# Patient Record
(demographics unavailable — no encounter records)

---

## 2024-11-26 NOTE — PROCEDURE
[Endometrial Biopsy] : an endometrial biopsy [Thickened Endometrium] : thickened endometrium [Patient] : the patient [Verbal Consent] : verbal consent was obtained prior to the procedure and is detailed in the patient's record [Yes] : the specimen was sent to pathology [No Complications] : none [Tolerated Well] : the patient tolerated the procedure well [FreeTextEntry1] : Pipelle introduced to 7cm. Three passes, scant tissue.

## 2024-11-26 NOTE — ASSESSMENT
[FreeTextEntry1] : I discussed with the patient with the aid of diagrams, reviewed the findings on history and physical examination, and reviewed the imaging studies in detail. We discussed her history of recurrent endometrial polyps and thickened endometrium lining. Patient is interested in definitive management.  	 Surgical approach of hysterectomy also discussed- including minimally invasive and open approaches. Minimally invasive approach will be attempted if appropriate, however if the size of the uterus/fibroids exceed that which is appropriate laparoscopically, an open abdominal approach will be selected. TLH/BSO recommended.  Complications that include, but are not limited to: bleeding, infection, injury to other organs including bowel, bladder, ureters, blood vessels, nerves; infections, blood clots, lymphedema, pneumonia, wound complications and prolonged hospital stay have all been discussed with the patient. Whenever minimally invasive surgery is attempted, there is a chance of needing to convert to laparotomy. The risk of occult injury requiring additional surgery also discussed. I have also provided her with the diagrams.    Surgical scheduling was discussed and instructions for optimization prior to surgery were given. will follow the Enhanced Recovery After Surgery (ERAS) protocol.   She will choose a surgical date. No aspirin or NSAID products for 1 week prior.   [] Embx [] Medical clearance [] PRe-op labs [] TLH/BSO (patient to choose a date, maybe January)

## 2024-11-26 NOTE — PHYSICAL EXAM
[Chaperone Present] : A chaperone was present in the examining room during all aspects of the physical examination [72212] : A chaperone was present during the pelvic exam. [Normal] : Parametria: Normal

## 2024-11-26 NOTE — REASON FOR VISIT
[FreeTextEntry1] : 72yo here for embx 2' thickened Endometrial stripe 0.9cm. Patient presents today requesting a hysterectomy. She is tired of recurrent endometrial thickening and polyps and would like definitive treatment. Importance of biopsy today discussed.   Lao translation services offered, patient declined.   GynHx: As above  ObHx:  x 3  PmHx: None  SurgHx: None  Meds: Sertraline, simvastatin  All: NKDA  SocHx: No toxic habits  FamHx: No cancers    Mammogram/US: 2024- neg Colonoscopy: 2023 negative PAP/HPV: 2023 neg

## 2024-11-26 NOTE — END OF VISIT
Wicho Peters, Ms Jaimie say her current meter supplies are not being covered by Medicare. She says they informed her that she wouldn't be approved for both Dexcom G7 supplies and meter supplies. Does this sound right? Thank you, Kelin [Time Spent: ___ minutes] : I have spent [unfilled] minutes of time on the encounter which excludes teaching and separately reported services.

## 2024-11-26 NOTE — HISTORY OF PRESENT ILLNESS
[FreeTextEntry1] : Problem 1) no polyp- benign endometrium 2) Lichen Sclerosis  Previous Therapy 1) Pap 4/23/18    a)NILM 2)TVUS 5/14/18    a)Right ovarian complex cyst measuring 2.0cm with no internal vascularity.     b)Normal left ovarian cyst  3)US 8/27/18    a)Uterus of 7.2cm   b )Right ovary measures 2.7cm containing a stable but mildly complex cyst with low-level echoes measuring 1.9cm with no internal vascularity.     c)Left ovary is 1.9cm with a new simple cyst of 1.3cm. 4. Pelvis US 11/23/21    a) uterus 7.5cm. Simple fluid distends the endometrium.     b) endometrium- 1cm soft tissue nodule c/w polyp    c) 17 mm simple cyst in the right ovary  5) D&C, Hysteroscopy, polypectomy 3/21/22     a) atrophic endometrium     b) Unremarkable squamous epithelium and endocervical mucosa  6) TVUS 6/1/23     a) Uterus 8.0cm     b) cystic changes in fundal portion of endometrium 2.6 x 1.3 x 1.5     c) EMS 0.5cm     d) RO 2.8 w/ 2cm cyst     e) LO 2.5cm w/ 1.3cm cyst 7) S/P Dilation and curettage, hysteroscopy 8/21/23     a) - Very scant atrophic endometrium, inspissated mucus, and calcific debris.     b) - Fragment of unremarkable squamous epithelium.  8) TVUS 9/30/24     a) uterine fundus, the endometrium measures 9 mm with cystic foci     b) RO: 2 x 9 x 1.8 cm. A single right ovarian cyst measures 1.7 x 1.4 x 1.4 cm, previously 2 cm     c) LO: 2.6 x 1.6 x 1.7 cm. Left ovarian cyst previously seen is not identified.     d) uterus measures 7.5 x 3.2 x 4.2 cm

## 2024-11-26 NOTE — PHYSICAL EXAM
[Chaperone Present] : A chaperone was present in the examining room during all aspects of the physical examination [[id number]] : A chaperone was present during the pelvic exam. [Normal] : Parametria: Normal

## 2024-12-23 NOTE — REASON FOR VISIT
[FreeTextEntry1] : 72yo here for embx 2' thickened Endometrial stripe 0.9cm. Patient presents today requesting a hysterectomy. She is tired of recurrent endometrial thickening and polyps and would like definitive treatment. Importance of biopsy today discussed.   Turkish translation services offered, patient declined.   GynHx: As above  ObHx:  x 3  PmHx: None  SurgHx: None  Meds: Sertraline, simvastatin  All: NKDA  SocHx: No toxic habits  FamHx: No cancers    Mammogram/US: 2024- neg Colonoscopy: 2023 negative PAP/HPV: 2023 neg no